# Patient Record
Sex: MALE | URBAN - METROPOLITAN AREA
[De-identification: names, ages, dates, MRNs, and addresses within clinical notes are randomized per-mention and may not be internally consistent; named-entity substitution may affect disease eponyms.]

---

## 2023-02-06 ENCOUNTER — TELEPHONE ENCOUNTER (OUTPATIENT)
Dept: URBAN - METROPOLITAN AREA CLINIC 72 | Facility: CLINIC | Age: 68
End: 2023-02-06

## 2023-02-10 ENCOUNTER — OFFICE VISIT (OUTPATIENT)
Dept: URBAN - METROPOLITAN AREA CLINIC 72 | Facility: CLINIC | Age: 68
End: 2023-02-10
Payer: MEDICARE

## 2023-02-10 ENCOUNTER — DASHBOARD ENCOUNTERS (OUTPATIENT)
Age: 68
End: 2023-02-10

## 2023-02-10 VITALS
DIASTOLIC BLOOD PRESSURE: 83 MMHG | BODY MASS INDEX: 31.68 KG/M2 | SYSTOLIC BLOOD PRESSURE: 133 MMHG | HEIGHT: 73 IN | HEART RATE: 90 BPM | WEIGHT: 239 LBS | TEMPERATURE: 98.4 F

## 2023-02-10 DIAGNOSIS — D50.8 ACQUIRED IRON DEFICIENCY ANEMIA DUE TO DECREASED ABSORPTION: ICD-10-CM

## 2023-02-10 DIAGNOSIS — K21.9 GASTROESOPHAGEAL REFLUX DISEASE WITHOUT ESOPHAGITIS: ICD-10-CM

## 2023-02-10 PROBLEM — 266435005: Status: ACTIVE | Noted: 2023-02-10

## 2023-02-10 PROBLEM — 87522002: Status: ACTIVE | Noted: 2023-02-10

## 2023-02-10 PROCEDURE — 99204 OFFICE O/P NEW MOD 45 MIN: CPT | Performed by: NURSE PRACTITIONER

## 2023-02-10 RX ORDER — OMEPRAZOLE 20 MG/1
CAPSULE, DELAYED RELEASE ORAL
Qty: 90 CAPSULE | Status: ACTIVE | COMMUNITY

## 2023-02-10 RX ORDER — ALLOPURINOL 100 MG/1
TABLET ORAL
Qty: 90 TABLET | Status: ACTIVE | COMMUNITY

## 2023-02-10 RX ORDER — FUROSEMIDE 20 MG/1
TABLET ORAL
Qty: 360 TABLET | Status: ACTIVE | COMMUNITY

## 2023-02-10 RX ORDER — METOPROLOL SUCCINATE 50 MG/1
TAKE 1 TABLET BY MOUTH TWICE DAILY TABLET, EXTENDED RELEASE ORAL
Qty: 180 EACH | Refills: 1 | Status: ACTIVE | COMMUNITY

## 2023-02-10 RX ORDER — AMLODIPINE BESYLATE 10 MG/1
TABLET ORAL
Qty: 90 TABLET | Status: ACTIVE | COMMUNITY

## 2023-02-10 RX ORDER — ENZALUTAMIDE 40 MG/1
CAPSULE ORAL
Qty: 120 CAPSULE | Status: ACTIVE | COMMUNITY

## 2023-02-10 RX ORDER — SODIUM BICARBONATE 650 MG/1
TAKE 1 TABLET BY MOUTH THREE TIMES DAILY TABLET ORAL
Qty: 90 EACH | Refills: 0 | Status: ACTIVE | COMMUNITY

## 2023-02-10 RX ORDER — ATORVASTATIN CALCIUM 80 MG/1
TABLET, FILM COATED ORAL
Qty: 90 TABLET | Status: ACTIVE | COMMUNITY

## 2023-02-10 RX ORDER — LOSARTAN POTASSIUM 25 MG/1
TABLET, FILM COATED ORAL
Qty: 90 TABLET | Status: ACTIVE | COMMUNITY

## 2023-02-10 NOTE — HPI-SCREENING
67-year-old male new to the clinic referred by Dr. Brush/Chelo Herrera NP, for PETAR  Past medical history of hypertension, CHF, metastatic prostate cancer with mets to bone, GERD, gout, went into herat block and permanent pacemaker placed in July. Hospitalized at  last summer with CHF and severe anemia.  Had positive Hemoccult.  Did have gross hematuria.  Follows Dr. Ocasio. Was seen by GI had an EGD that apparently was unremarkable and outpatient colonoscopy recommended.  Received iron infusions 9/8, 9/15, 9/22 and again 12/22, 12/29 and 1/5/23.  Labs 8/30/2022.  Iron sat 5.7%, ferritin 24.4. Labs 12/6/2022.  CBC: Hgb 12.5.  Iron sat 11%, ferritin 12.2. Labs 1/9/2023 WBC 6.51, hemoglobin 13, hematocrit 40.1, MCV 69.3 platelet normal at 440, BUN 22, CMP: Creatinine 1.4, BUN normal at 22, glucose 155.  PSA 4.5.  On interview today he hasn't been in the hospital since October.  He denies abdominal pain, melena, hematochezia, altered bowel movements.  No GERD.  Does have little short of breath with activity denies that it is worse than previous. He is excited about the upcoming [email or web address] game.

## 2023-03-10 ENCOUNTER — OFFICE VISIT (OUTPATIENT)
Dept: URBAN - METROPOLITAN AREA CLINIC 72 | Facility: CLINIC | Age: 68
End: 2023-03-10

## 2023-03-10 ENCOUNTER — TELEPHONE ENCOUNTER (OUTPATIENT)
Dept: URBAN - METROPOLITAN AREA CLINIC 72 | Facility: CLINIC | Age: 68
End: 2023-03-10

## 2023-03-10 RX ORDER — ALLOPURINOL 100 MG/1
TABLET ORAL
Qty: 90 TABLET | Status: ACTIVE | COMMUNITY

## 2023-03-10 RX ORDER — OMEPRAZOLE 20 MG/1
CAPSULE, DELAYED RELEASE ORAL
Qty: 90 CAPSULE | Status: ACTIVE | COMMUNITY

## 2023-03-10 RX ORDER — LOSARTAN POTASSIUM 25 MG/1
TABLET, FILM COATED ORAL
Qty: 90 TABLET | Status: ACTIVE | COMMUNITY

## 2023-03-10 RX ORDER — ATORVASTATIN CALCIUM 80 MG/1
TABLET, FILM COATED ORAL
Qty: 90 TABLET | Status: ACTIVE | COMMUNITY

## 2023-03-10 RX ORDER — AMLODIPINE BESYLATE 10 MG/1
TABLET ORAL
Qty: 90 TABLET | Status: ACTIVE | COMMUNITY

## 2023-03-10 RX ORDER — ENZALUTAMIDE 40 MG/1
CAPSULE ORAL
Qty: 120 CAPSULE | Status: ACTIVE | COMMUNITY

## 2023-03-10 RX ORDER — SODIUM BICARBONATE 650 MG/1
TAKE 1 TABLET BY MOUTH THREE TIMES DAILY TABLET ORAL
Qty: 90 EACH | Refills: 0 | Status: ACTIVE | COMMUNITY

## 2023-03-10 RX ORDER — METOPROLOL SUCCINATE 50 MG/1
TAKE 1 TABLET BY MOUTH TWICE DAILY TABLET, EXTENDED RELEASE ORAL
Qty: 180 EACH | Refills: 1 | Status: ACTIVE | COMMUNITY

## 2023-03-10 RX ORDER — FUROSEMIDE 20 MG/1
TABLET ORAL
Qty: 360 TABLET | Status: ACTIVE | COMMUNITY

## 2023-03-10 NOTE — HPI-SCREENING
67-year-old male new to the clinic here for a 1 month follow-up appointment for PETAR.  Follows hematology Dr. Brush.  Had EGD and Colon in the fall.  Records were requested.  Consider capsule endoscopy.  Past medical history of hypertension, CHF, metastatic prostate cancer with mets to bone, GERD, gout, went into herat block and permanent pacemaker placed in July. Hospitalized at  last summer with CHF and severe anemia.  Had positive Hemoccult.  Did have gross hematuria.  Follows Dr. Ocasio. Was seen by GI had an EGD that apparently was unremarkable and outpatient colonoscopy recommended.  Received iron infusions 9/8, 9/15, 9/22 and again 12/22, 12/29 and 1/5/23.  Labs 8/30/2022.  Iron sat 5.7%, ferritin 24.4. Labs 12/6/2022.  CBC: Hgb 12.5.  Iron sat 11%, ferritin 12.2. Labs 1/9/2023 WBC 6.51, hemoglobin 13, hematocrit 40.1, MCV 69.3 platelet normal at 440, BUN 22, CMP: Creatinine 1.4, BUN normal at 22, glucose 155.  PSA 4.5.  Last note:  On interview today he hasn't been in the hospital since October.  He denies abdominal pain, melena, hematochezia, altered bowel movements.  No GERD.  Does have little short of breath with activity denies that it is worse than previous. He is excited about the upcoming [employer] game.